# Patient Record
Sex: MALE | Race: WHITE
[De-identification: names, ages, dates, MRNs, and addresses within clinical notes are randomized per-mention and may not be internally consistent; named-entity substitution may affect disease eponyms.]

---

## 2022-08-08 ENCOUNTER — HOSPITAL ENCOUNTER (EMERGENCY)
Dept: HOSPITAL 33 - ED | Age: 27
Discharge: HOME | End: 2022-08-08
Payer: COMMERCIAL

## 2022-08-08 VITALS — DIASTOLIC BLOOD PRESSURE: 67 MMHG | SYSTOLIC BLOOD PRESSURE: 121 MMHG | HEART RATE: 50 BPM

## 2022-08-08 DIAGNOSIS — Z28.310: ICD-10-CM

## 2022-08-08 DIAGNOSIS — R06.00: ICD-10-CM

## 2022-08-08 DIAGNOSIS — Z72.0: ICD-10-CM

## 2022-08-08 DIAGNOSIS — T63.441A: Primary | ICD-10-CM

## 2022-08-08 DIAGNOSIS — R20.2: ICD-10-CM

## 2022-08-08 PROCEDURE — 99281 EMR DPT VST MAYX REQ PHY/QHP: CPT

## 2022-08-08 RX ADMIN — DIPHENHYDRAMINE HYDROCHLORIDE ONE MG: 25 CAPSULE ORAL at 12:39

## 2022-08-08 NOTE — ERPHSYRPT
- History of Present Illness


Source: patient


Exam Limitations: other (History through )


Patient Subjective Stated Complaint: pt here for a bee sting to upper left 

eyebrow. pt got epi injection pta,


Triage Nursing Assessment: pt alert, walked in, resp easy, skin w/d/p, has 

swelling to upper left eyebrow, ice applied, no difficulty swallowing or 

breathing


Physician History: 





27 yo HM w h/o allergic rx to bee stings was stung on glabella slightly before 

arrival. Pt given IM Epi x1 and is currently asymptomatic. He states that he was

mildly dyspneic at first w numb hands but resolved after IM Epi.


Timing/Duration: other (1 Hr before arrival)


Modifying Factors: Improves With: other (Better w Im Epi)


Associated Symptoms: denies symptoms


Allergies/Adverse Reactions: 








amoxicillin Allergy (Verified 08/08/22 12:35)


   


Penicillins Allergy (Verified 08/08/22 12:35)


   





Home Medications: 








No Reportable Medications [No Reported Medications]  08/08/22 [History]





Hx Tetanus, Diphtheria Vaccination/Date Given: No


Hx Influenza Vaccination/Date Given: No


Hx Pneumococcal Vaccination/Date Given: No


Immunizations Up to Date:  (unknown)





Travel Risk





- International Travel


Have you traveled outside of the country in past 3 weeks: No





- Coronavirus Screening


Are you exhibiting any of the following symptoms?: No


Close contact with a COVID-19 positive Pt in past 14-21 Days: No





- Vaccine Status


Have you recieved a Covid-19 vaccination: No


: Unknown





- Vaccination Dates


Dates if Unknown: ?





- Review of Systems


Constitutional: No Symptoms


Eyes: No Symptoms


Ears, Nose, & Throat: No Symptoms


Respiratory: No Symptoms


Cardiac: No Symptoms


Abdominal/Gastrointestinal: No Symptoms


Genitourinary Symptoms: No Symptoms


Musculoskeletal: No Symptoms


Skin: No Symptoms


Neurological: No Symptoms


Psychological: No Symptoms


Endocrine: No Symptoms


Hematologic/Lymphatic: No Symptoms


Immunological/Allergic: No Symptoms





- Past Medical History


Pertinent Past Medical History: No





- Past Surgical History


Past Surgical History: No





- Social History


Smoking Status: Current every day smoker


Exposure to second hand smoke: Yes


Drug Use: none


Patient Lives Alone: No





- Nursing Vital Signs


Nursing Vital Signs: 


                               Initial Vital Signs











Temperature  97.8 F   08/08/22 12:27


 


Pulse Rate  63   08/08/22 12:27


 


Respiratory Rate  16   08/08/22 12:27


 


Blood Pressure  149/68   08/08/22 12:27


 


O2 Sat by Pulse Oximetry  99   08/08/22 12:27








                                   Pain Scale











Pain Intensity                 0











Mildly hypertensive





- Physical Exam


General Appearance: no apparent distress


Eye Exam: PERRL/EOMI, eyes nml inspection


Ears, Nose, Throat Exam: normal ENT inspection, TMs normal, pharynx normal, 

moist mucous membranes


Neck Exam: normal inspection, non-tender, supple, full range of motion, No 

meningismus, No mass, No Brudzinski, No Kernig's


Respiratory Exam: normal breath sounds, lungs clear, airway intact, No chest 

tenderness, No respiratory distress


Cardiovascular Exam: regular rate/rhythm, normal heart sounds, normal peripheral

 pulses, capillary refill <2 sec, No murmur


Gastrointestinal/Abdomen Exam: soft, normal bowel sounds, No tenderness


Back Exam: normal inspection, normal range of motion, No CVA tenderness, No 

vertebral tenderness


Extremity Exam: normal inspection, normal range of motion, pelvis stable


Neurologic Exam: alert, oriented x 3, cooperative, CNs II-XII nml as tested, 

normal mood/affect, nml cerebellar function, nml station & gait, sensation nml


Skin Exam: other (Small area of edema/erythema L glabella)


Lymphatic Exam: No adenopathy


**SpO2 Interpretation**: normal


SpO2: 99


O2 Delivery: Room Air





- Course


Nursing assessment & vital signs reviewed: Yes


Ordered Tests: 


Medication Summary














Discontinued Medications














Generic Name Dose Route Start Last Admin





  Trade Name Coltq  PRN Reason Stop Dose Admin


 


Diphenhydramine HCl  25 mg  08/08/22 12:37  08/08/22 12:39





  Diphenhydramine Hcl 25 Mg Capsule  PO  08/08/22 12:38  25 mg





  STAT ONE   Administration


 


Diphenhydramine HCl  Confirm  08/08/22 12:38 





  Diphenhydramine Hcl 25 Mg Capsule  Administered  08/08/22 12:39 





  Dose  





  25 mg  





  .ROUTE  





  .STK-MED ONE  














- Progress


Progress: improved


Progress Note: 





08/08/22 12:46


25mg po Benadryl


08/09/22 01:01


No evidence of anaphylaxis in ER


Counseled pt/family regarding: diagnosis, need for follow-up





- Departure


Departure Disposition: Home


Clinical Impression: 


 Bee sting allergy





Condition: Stable


Critical Care Time: No


Referrals: 


DOCTOR,NO FAMILY [Primary Care Provider] - Follow up/PCP as directed


Instructions:  Insect Bites and Stings (DC)


Additional Instructions: 


Continue Benadryl 25mg every 6 hours as needed


EpiPen as needed, may repeat x1


Return to ER for severe reaction after injecting EpiPen

## 2022-08-09 VITALS — OXYGEN SATURATION: 99 %
